# Patient Record
Sex: FEMALE | URBAN - METROPOLITAN AREA
[De-identification: names, ages, dates, MRNs, and addresses within clinical notes are randomized per-mention and may not be internally consistent; named-entity substitution may affect disease eponyms.]

---

## 2021-06-22 ENCOUNTER — TRANSCRIBE ORDER (OUTPATIENT)
Dept: SCHEDULING | Age: 51
End: 2021-06-22

## 2021-06-22 DIAGNOSIS — Z12.31 SCREENING MAMMOGRAM FOR HIGH-RISK PATIENT: Primary | ICD-10-CM

## 2023-08-09 ENCOUNTER — HOSPITAL ENCOUNTER (EMERGENCY)
Facility: HOSPITAL | Age: 53
Discharge: HOME | End: 2023-08-09
Attending: EMERGENCY MEDICINE
Payer: COMMERCIAL

## 2023-08-09 ENCOUNTER — APPOINTMENT (OUTPATIENT)
Facility: HOSPITAL | Age: 53
End: 2023-08-09
Payer: COMMERCIAL

## 2023-08-09 VITALS
HEART RATE: 93 BPM | DIASTOLIC BLOOD PRESSURE: 83 MMHG | BODY MASS INDEX: 42.13 KG/M2 | RESPIRATION RATE: 18 BRPM | TEMPERATURE: 99.7 F | HEIGHT: 68 IN | SYSTOLIC BLOOD PRESSURE: 157 MMHG | OXYGEN SATURATION: 99 % | WEIGHT: 278 LBS

## 2023-08-09 DIAGNOSIS — K04.7 DENTAL INFECTION: Primary | ICD-10-CM

## 2023-08-09 DIAGNOSIS — R22.0 SWELLING OF LEFT SIDE OF FACE: ICD-10-CM

## 2023-08-09 LAB
ALBUMIN SERPL-MCNC: 3.8 G/DL (ref 3.5–5)
ALBUMIN/GLOB SERPL: 0.9 (ref 1.1–2.2)
ALP SERPL-CCNC: 113 U/L (ref 45–117)
ALT SERPL-CCNC: 69 U/L (ref 12–78)
ANION GAP SERPL CALC-SCNC: 7 MMOL/L (ref 5–15)
AST SERPL-CCNC: 26 U/L (ref 15–37)
BASOPHILS # BLD: 0.1 K/UL (ref 0–0.1)
BASOPHILS NFR BLD: 1 % (ref 0–1)
BILIRUB SERPL-MCNC: 0.5 MG/DL (ref 0.2–1)
BUN SERPL-MCNC: 8 MG/DL (ref 6–20)
BUN/CREAT SERPL: 10 (ref 12–20)
CALCIUM SERPL-MCNC: 9.2 MG/DL (ref 8.5–10.1)
CHLORIDE SERPL-SCNC: 103 MMOL/L (ref 97–108)
CO2 SERPL-SCNC: 25 MMOL/L (ref 21–32)
CREAT SERPL-MCNC: 0.79 MG/DL (ref 0.55–1.02)
DIFFERENTIAL METHOD BLD: NORMAL
EOSINOPHIL # BLD: 0.3 K/UL (ref 0–0.4)
EOSINOPHIL NFR BLD: 3 % (ref 0–7)
ERYTHROCYTE [DISTWIDTH] IN BLOOD BY AUTOMATED COUNT: 13 % (ref 11.5–14.5)
GLOBULIN SER CALC-MCNC: 4.1 G/DL (ref 2–4)
GLUCOSE SERPL-MCNC: 148 MG/DL (ref 65–100)
HCT VFR BLD AUTO: 43.1 % (ref 35–47)
HGB BLD-MCNC: 14.5 G/DL (ref 11.5–16)
IMM GRANULOCYTES # BLD AUTO: 0 K/UL (ref 0–0.04)
IMM GRANULOCYTES NFR BLD AUTO: 0 % (ref 0–0.5)
LACTATE BLD-SCNC: 1.79 MMOL/L (ref 0.4–2)
LYMPHOCYTES # BLD: 1.8 K/UL (ref 0.8–3.5)
LYMPHOCYTES NFR BLD: 17 % (ref 12–49)
MCH RBC QN AUTO: 32.2 PG (ref 26–34)
MCHC RBC AUTO-ENTMCNC: 33.6 G/DL (ref 30–36.5)
MCV RBC AUTO: 95.6 FL (ref 80–99)
MONOCYTES # BLD: 0.7 K/UL (ref 0–1)
MONOCYTES NFR BLD: 7 % (ref 5–13)
NEUTS SEG # BLD: 7.3 K/UL (ref 1.8–8)
NEUTS SEG NFR BLD: 72 % (ref 32–75)
NRBC # BLD: 0 K/UL (ref 0–0.01)
NRBC BLD-RTO: 0 PER 100 WBC
PLATELET # BLD AUTO: 335 K/UL (ref 150–400)
PMV BLD AUTO: 9.5 FL (ref 8.9–12.9)
POTASSIUM SERPL-SCNC: 4 MMOL/L (ref 3.5–5.1)
PROT SERPL-MCNC: 7.9 G/DL (ref 6.4–8.2)
RBC # BLD AUTO: 4.51 M/UL (ref 3.8–5.2)
SODIUM SERPL-SCNC: 135 MMOL/L (ref 136–145)
WBC # BLD AUTO: 10.1 K/UL (ref 3.6–11)

## 2023-08-09 PROCEDURE — 6360000002 HC RX W HCPCS: Performed by: EMERGENCY MEDICINE

## 2023-08-09 PROCEDURE — 96375 TX/PRO/DX INJ NEW DRUG ADDON: CPT

## 2023-08-09 PROCEDURE — 70491 CT SOFT TISSUE NECK W/DYE: CPT

## 2023-08-09 PROCEDURE — 83605 ASSAY OF LACTIC ACID: CPT

## 2023-08-09 PROCEDURE — 99285 EMERGENCY DEPT VISIT HI MDM: CPT

## 2023-08-09 PROCEDURE — 6360000004 HC RX CONTRAST MEDICATION: Performed by: EMERGENCY MEDICINE

## 2023-08-09 PROCEDURE — 80053 COMPREHEN METABOLIC PANEL: CPT

## 2023-08-09 PROCEDURE — 96365 THER/PROPH/DIAG IV INF INIT: CPT

## 2023-08-09 PROCEDURE — 36415 COLL VENOUS BLD VENIPUNCTURE: CPT

## 2023-08-09 PROCEDURE — 85025 COMPLETE CBC W/AUTO DIFF WBC: CPT

## 2023-08-09 PROCEDURE — 2580000003 HC RX 258: Performed by: EMERGENCY MEDICINE

## 2023-08-09 RX ORDER — 0.9 % SODIUM CHLORIDE 0.9 %
1000 INTRAVENOUS SOLUTION INTRAVENOUS ONCE
Status: COMPLETED | OUTPATIENT
Start: 2023-08-09 | End: 2023-08-09

## 2023-08-09 RX ORDER — ONDANSETRON 2 MG/ML
4 INJECTION INTRAMUSCULAR; INTRAVENOUS
Status: COMPLETED | OUTPATIENT
Start: 2023-08-09 | End: 2023-08-09

## 2023-08-09 RX ORDER — CLINDAMYCIN PHOSPHATE 300 MG/50ML
300 INJECTION INTRAVENOUS
Status: COMPLETED | OUTPATIENT
Start: 2023-08-09 | End: 2023-08-09

## 2023-08-09 RX ORDER — OXYCODONE HYDROCHLORIDE 5 MG/1
5 TABLET ORAL EVERY 6 HOURS PRN
Qty: 12 TABLET | Refills: 0 | Status: SHIPPED | OUTPATIENT
Start: 2023-08-09 | End: 2023-08-12

## 2023-08-09 RX ORDER — CLINDAMYCIN HYDROCHLORIDE 300 MG/1
300 CAPSULE ORAL 3 TIMES DAILY
Qty: 21 CAPSULE | Refills: 0 | Status: SHIPPED | OUTPATIENT
Start: 2023-08-09 | End: 2023-08-16

## 2023-08-09 RX ORDER — MORPHINE SULFATE 4 MG/ML
4 INJECTION, SOLUTION INTRAMUSCULAR; INTRAVENOUS
Status: DISCONTINUED | OUTPATIENT
Start: 2023-08-09 | End: 2023-08-09 | Stop reason: HOSPADM

## 2023-08-09 RX ADMIN — CLINDAMYCIN PHOSPHATE 300 MG: 300 INJECTION, SOLUTION INTRAVENOUS at 06:52

## 2023-08-09 RX ADMIN — IOPAMIDOL 100 ML: 755 INJECTION, SOLUTION INTRAVENOUS at 06:47

## 2023-08-09 RX ADMIN — ONDANSETRON HYDROCHLORIDE 4 MG: 2 SOLUTION INTRAMUSCULAR; INTRAVENOUS at 06:28

## 2023-08-09 RX ADMIN — SODIUM CHLORIDE 1000 ML: 9 INJECTION, SOLUTION INTRAVENOUS at 06:26

## 2023-08-09 RX ADMIN — MORPHINE SULFATE 4 MG: 4 INJECTION, SOLUTION INTRAMUSCULAR; INTRAVENOUS at 06:29

## 2023-08-09 ASSESSMENT — LIFESTYLE VARIABLES
HOW MANY STANDARD DRINKS CONTAINING ALCOHOL DO YOU HAVE ON A TYPICAL DAY: 1 OR 2
HOW OFTEN DO YOU HAVE A DRINK CONTAINING ALCOHOL: MONTHLY OR LESS

## 2023-08-09 ASSESSMENT — PAIN - FUNCTIONAL ASSESSMENT: PAIN_FUNCTIONAL_ASSESSMENT: 0-10

## 2023-08-09 ASSESSMENT — PAIN SCALES - GENERAL: PAINLEVEL_OUTOF10: 10

## 2023-08-09 NOTE — DISCHARGE INSTRUCTIONS
You were evaluated in the emergency department for left sided facial swelling/pain due to a dental infection. Your examination was reassuring as was your work-up including CT scan which does not show signs of an abscess and blood work. It will be important for you to follow-up with your Dentist as soon as possible. If you develop worsening symptoms such as worsening facial or neck swelling, fevers, or inability to eat, please call [facility].

## 2023-08-09 NOTE — ED PROVIDER NOTES
[facility] EMERGENCY DEPT  EMERGENCY DEPARTMENT ENCOUNTER       Pt Name: Ryan Fonseca  MRN: [id number]  [address] [facility] [place] 1970  Date of evaluation: 8/9/2023  Provider: Hung Vasquez MD   PCP: No primary care provider on file. Note Started: 8:10 AM 8/9/23     CHIEF COMPLAINT       Chief Complaint   Patient presents with    Facial Swelling     Patient ambulatory to triage w c/o facial swelling onset this morning waking up. Patient reports that she is scheduled for a root canal on Mon and she just finished a z pack. HISTORY OF PRESENT ILLNESS: 1 or more elements      History From: Patient, History limited by: No limitations     Ryan Fonseca is a 48 y.o. female who presents with chief complaint of left-sided facial swelling, pain. Symptoms onset over the past 2 days, significantly worsened overnight. Patient endorses pain to left lower jawline where she has had root canal and bridge work performed. She started to develop pain in this area about a week ago, completed a course of azithromycin which helped her symptoms. Over the past 2 days she has recurrence of severe pain and then significant facial swelling overnight. She endorses subjective fevers and chills, diaphoresis, headache and bodyaches at home. She has follow-up with her dentist on Monday. Nursing Notes were all reviewed and agreed with or any disagreements were addressed in the HPI. REVIEW OF SYSTEMS        Positives and Pertinent negatives as per HPI. PAST HISTORY     Past Medical History:  History reviewed. No pertinent past medical history. Past Surgical History:  History reviewed. No pertinent surgical history. Family History:  History reviewed. No pertinent family history. Social History:  Social History     Tobacco Use    Smoking status: Never    Smokeless tobacco: Never   Substance Use Topics    Alcohol use: Not Currently       Allergies:   Allergies   Allergen Reactions    Penicillins Rash       CURRENT

## 2023-08-10 LAB
COMMENT:: NORMAL
SPECIMEN HOLD: NORMAL

## 2024-06-27 ENCOUNTER — TELEPHONE (OUTPATIENT)
Facility: HOSPITAL | Age: 54
End: 2024-06-27

## 2024-06-27 DIAGNOSIS — C50.912 MALIGNANT NEOPLASM OF LEFT BREAST IN FEMALE, ESTROGEN RECEPTOR POSITIVE, UNSPECIFIED SITE OF BREAST (HCC): Primary | ICD-10-CM

## 2024-06-27 DIAGNOSIS — Z17.0 MALIGNANT NEOPLASM OF LEFT BREAST IN FEMALE, ESTROGEN RECEPTOR POSITIVE, UNSPECIFIED SITE OF BREAST (HCC): Primary | ICD-10-CM

## 2024-06-27 NOTE — TELEPHONE ENCOUNTER
[facility]  Breast Navigator Encounter    Name:    Sana Ahmadi  Age:    54 y.o.  Diagnosis:   LEFT breast cancer    Attempted to reach out to patient prior to her appointment on July 10, 2024.      She was not available, so I left a message asking her to call me back at her convenience.       I also mentioned on the message that I was trying to get her set up for a breast MRI prior to her appointment.    I also mentioned that I would be off tomorrow and back in the office on July 1, 2024.         ADDENDUM:   Patient called back within half an hour.      I explained what happens at the first consultation - an exam by the physician, a possible ultrasound, then complete discussion of surgical options and other treatment that may be considered.  I encouraged the patient to bring  someone with her to this appointment.  She will bring her .       Discussed that a breast MRI has been ordered by Dr. Rivera, and is the next step in her work-up.  I explained the MRI procedure to her.  I confirmed that she is not claustrophobic, does not have breast implants and does not have any metal in her body.  I explained that she can eat and drink normally and can drive herself to and from the appointment.  I told her that she would be asked to remove most metal items.   I was able to get her scheduled for July 1, 2024 at [facility] MRI at 1:45 pm.      She has already seen Dr. Valdez, but still has quite a few questions.  I told her to bring these with her to the appt.  I discussed that she is ER+ and HER-2 equivocal still with FISH pending.  I explained that the results of these will help to determine systemic treatment.      I provided the patient with my name and phone number.  Discussed the role of navigation.  I will meet the patient when she comes in for her initial visit.        She was very appreciative of the phone call.                          Natalie Girard, RN, BSN, CBCN  Oncology Breast

## 2024-07-01 ENCOUNTER — HOSPITAL ENCOUNTER (OUTPATIENT)
Facility: HOSPITAL | Age: 54
Discharge: HOME | End: 2024-07-04
Attending: SURGERY
Payer: COMMERCIAL

## 2024-07-01 DIAGNOSIS — C50.912 MALIGNANT NEOPLASM OF LEFT BREAST IN FEMALE, ESTROGEN RECEPTOR POSITIVE, UNSPECIFIED SITE OF BREAST (HCC): ICD-10-CM

## 2024-07-01 DIAGNOSIS — Z17.0 MALIGNANT NEOPLASM OF LEFT BREAST IN FEMALE, ESTROGEN RECEPTOR POSITIVE, UNSPECIFIED SITE OF BREAST (HCC): ICD-10-CM

## 2024-07-01 PROCEDURE — C8908 MRI W/O FOL W/CONT, BREAST,: HCPCS

## 2024-07-01 PROCEDURE — 6360000004 HC RX CONTRAST MEDICATION: Performed by: SURGERY

## 2024-07-01 PROCEDURE — 2580000003 HC RX 258: Performed by: SURGERY

## 2024-07-01 PROCEDURE — A9579 GAD-BASE MR CONTRAST NOS,1ML: HCPCS | Performed by: SURGERY

## 2024-07-01 RX ORDER — 0.9 % SODIUM CHLORIDE 0.9 %
100 INTRAVENOUS SOLUTION INTRAVENOUS ONCE
Status: COMPLETED | OUTPATIENT
Start: 2024-07-01 | End: 2024-07-01

## 2024-07-01 RX ADMIN — SODIUM CHLORIDE 100 ML: 9 INJECTION, SOLUTION INTRAVENOUS at 12:43

## 2024-07-01 RX ADMIN — GADOTERIDOL 20 ML: 279.3 INJECTION, SOLUTION INTRAVENOUS at 12:41

## 2024-07-10 ENCOUNTER — SOCIAL WORK (OUTPATIENT)
Age: 54
End: 2024-07-10

## 2024-07-10 ENCOUNTER — TELEPHONE (OUTPATIENT)
Age: 54
End: 2024-07-10

## 2024-07-10 ENCOUNTER — CLINICAL DOCUMENTATION (OUTPATIENT)
Age: 54
End: 2024-07-10

## 2024-07-10 ENCOUNTER — OFFICE VISIT (OUTPATIENT)
Age: 54
End: 2024-07-10
Payer: COMMERCIAL

## 2024-07-10 VITALS — WEIGHT: 268 LBS | HEIGHT: 68 IN | BODY MASS INDEX: 40.62 KG/M2

## 2024-07-10 DIAGNOSIS — C50.912 INVASIVE LOBULAR CARCINOMA OF LEFT BREAST IN FEMALE (HCC): Primary | ICD-10-CM

## 2024-07-10 PROCEDURE — 76642 ULTRASOUND BREAST LIMITED: CPT | Performed by: SURGERY

## 2024-07-10 PROCEDURE — 99205 OFFICE O/P NEW HI 60 MIN: CPT | Performed by: SURGERY

## 2024-07-10 RX ORDER — CITALOPRAM 40 MG/1
1 TABLET ORAL DAILY
COMMUNITY

## 2024-07-10 RX ORDER — TIRZEPATIDE 7.5 MG/.5ML
INJECTION, SOLUTION SUBCUTANEOUS
COMMUNITY
Start: 2024-06-17

## 2024-07-10 RX ORDER — TIRZEPATIDE 5 MG/.5ML
INJECTION, SOLUTION SUBCUTANEOUS
COMMUNITY

## 2024-07-10 RX ORDER — AMLODIPINE BESYLATE 10 MG/1
1 TABLET ORAL DAILY
COMMUNITY

## 2024-07-10 RX ORDER — ZOLPIDEM TARTRATE 5 MG/1
5 TABLET ORAL NIGHTLY PRN
COMMUNITY
Start: 2024-04-24

## 2024-07-10 RX ORDER — LEMBOREXANT 10 MG/1
1 TABLET, FILM COATED ORAL
COMMUNITY
Start: 2024-05-23

## 2024-07-10 RX ORDER — MIRTAZAPINE 15 MG/1
15 TABLET, FILM COATED ORAL NIGHTLY
COMMUNITY
Start: 2024-06-20

## 2024-07-10 NOTE — PROGRESS NOTES
+HISTORY OF PRESENT ILLNESS  Sana Ahmadi is a 54 y.o. female     HPI NEW patient consult referred by Dr. Gisselle Ge for LEFT breast cancer, IDC.         24: LEFT breast, 2:00, 2 cmfn, bx PATH: ILC, grade 1, measuring 12 mm. ER+(97%)/ME-/HER2-, Ki-67 25%       Family History: Mother-breast cancer-dx age 60  age 64      Mammogram, 24, BIRADS 4c  [facility]      SOZO® Documentation for Visits L-Dex® Analysis for Lymphedema         No data to display                  The patient had a {Measurement timeframe:25714} SOZO measurement which I reviewed today. The score is {Sozo score:47664}, see flowsheet in EMR.     Review of Systems       Physical Exam       ASSESSMENT and PLAN  {Assessment and Plan Chronic Disease:1231369415}  
HISTORY OF PRESENT ILLNESS  Sana Ahmadi is a 54 y.o. female     HPI NEW patient consult referred by  *** for ***.      Family History:      Mammogram, ***, BIRADS ***      Review of Systems      Physical Exam       ASSESSMENT and PLAN  {Assessment and Plan Chronic Disease:4479350410}    
sounds. No stridor.   Chest:       Abdominal:      General: There is no distension.      Palpations: Abdomen is soft. There is no mass.      Tenderness: There is no abdominal tenderness.   Musculoskeletal:         General: Normal range of motion.      Cervical back: Normal range of motion and neck supple.   Lymphadenopathy:      Cervical: No cervical adenopathy.   Skin:     General: Skin is warm.   Neurological:      Mental Status: She is alert and oriented to person, place, and time.   Psychiatric:         Behavior: Behavior normal.         Thought Content: Thought content normal.         Judgment: Judgment normal.       BREAST ULTRASOUND, Pre-op Planning  Indication: Pre-op planning, LEFT breast IDC  Technique: The area was scanned using a high-frequency linear-array near-field transducer  Findings: diffused, hypoechoic streaking in breast and architecturally normal lymph nodes in LEFT axilla  Impression: Breast cancer  Disposition: Follow assessment and plan below.       ASSESSMENT and PLAN   Diagnosis Orders   1. Invasive lobular carcinoma of left breast in female (HCC)          New patient presents for consultation for treatment of LEFT breast ILC, ER+(97%)/IL-/HER2-, Ki-67 25%, clinical stage 2A. Physical examination of the LEFT breast noted a moderate-sized mass in the upper outer quadrant. Ultrasound of the LEFT breast visualized diffused, hypoechoic streaking in breast and architecturally normal lymph nodes in LEFT axilla.     We had a long discussion of options for treatment. The patient was accompanied by her  during this encounter, and over half the time was spent on counseling and coordination of care. We discussed in depth the pathology results, and the need for treatment. The goals of treatment are to treat the breast, and to reduce risk of local or distant recurrence.    We discussed treatment options with risks, complications, benefits, and limitations of lumpectomy with XRT, and mastectomy

## 2024-07-11 ENCOUNTER — CLINICAL DOCUMENTATION (OUTPATIENT)
Age: 54
End: 2024-07-11

## 2024-07-11 ENCOUNTER — PREP FOR PROCEDURE (OUTPATIENT)
Age: 54
End: 2024-07-11

## 2024-07-11 DIAGNOSIS — C50.912 INVASIVE LOBULAR CARCINOMA OF LEFT BREAST, STAGE 1 (HCC): ICD-10-CM

## 2024-07-11 DIAGNOSIS — C50.912 INVASIVE LOBULAR CARCINOMA OF LEFT BREAST IN FEMALE (HCC): Primary | ICD-10-CM

## 2024-07-11 DIAGNOSIS — C50.912 INVASIVE LOBULAR CARCINOMA OF BREAST, STAGE 1, LEFT (HCC): Primary | ICD-10-CM

## 2024-07-12 ENCOUNTER — CLINICAL DOCUMENTATION (OUTPATIENT)
Facility: HOSPITAL | Age: 54
End: 2024-07-12

## 2024-07-12 NOTE — PROGRESS NOTES
[facility]  Breast Navigator Encounter    Name:    Sana Ahmadi  Age:    54 y.o.  Diagnosis:   RIGHT breast cancer  Date of Visit:  7/10/2024    Met patient and her  at the time of her initial consultation with Dr. Rivera.  She and her  felt like all of their questions were answered.  She would like to use Dr. Rivera as her surgeon.   Has decided on BILATERAL mastectomies without reconstruction.      Discussed post-op care after mastectomies, demonstrated MICHELLE drain emptying and showed patient/ an actual MICHELLE drain.  Provided the patient with a post-op camisole, which she tried on.      I let her know that she will hear from Dr. Rivera's surgery scheduler in the next several days with a date/time for her surgery.      She met her  in [place], where she lived for 15 years.  Now lives in [place] in the [facility].  Has good support from her ; they have been  for 13 years.      Provided support.  Discussed that the role of the navigator is to coordinate timely care, make sure the plan of treatment is understood, and all questions are answered.   Provided patient with my business card.       I will continue to follow the patient.                              Natalie Girard, RN, BSN, [facility]  Oncology Breast Navigator     [address]  [address]  W: [phone]  F: [phone]  [email or web address]  Good Help to Those in Need®

## 2024-07-12 NOTE — PROGRESS NOTES
[facility]  Breast Navigator Encounter    Name:    Sana Ahmadi  Age:    54 y.o.  Diagnosis:   LEFT breast cancer    E-mailed patient MICHELLE drain handout as well as the MICHELLE drain output sheet.      Received response back from her that she received this.  Had some other questions.  See below:    I am so impressed with all of you and most recently danika - I couldn't believe that she was taking care of my scheduling at 8:30 this morning.     I have 3 questions actually- will I need to stay overnight at the hospital?  My friend sent me a lot of vitamin supplements- are there any concerns about taking this sort of thing?  And , lastly,  I talked coy into adopting a kitten if I can get a medical note saying it will enhance my outcome- any chance of this- the note not any outcome promises ha ha …    My response is below:      You will stay in the hospital for one night.  Dr. Rivera injects lots of local numbing agent at the time of the mastectomies, so pain is generally well controlled, and patients are ready to go home the next day.    You may take whatever vitamins/supplements that you want to right now, however you will need to stop all of these about one week pre-op (about 7/30) because many vitamins will thin the blood, and this can increase the bleeding risk.      I will ask Dr. Rivera if he will write a note about the kitten ?? I think pets always enhance our quality of life.   I'm sure that he won't mind doing this.                             Natalie Girard RN, BSN, [facility]  Oncology Breast Navigator     [address]  [address]  W: [phone]  F: [phone]  [email or web address]  Good Help to Those in Need®

## 2024-07-15 NOTE — PROGRESS NOTES
NCCN Distress Thermometer    Date Screening Completed: 7/10/24    Screening Declined:  [] Yes    Number that best describes how much distress you've experienced in the past week, including today?  0 [] - No distress 1 []      2 []      3 []      4 []       5 []       6 []      7 []      8 []      9 [x]       10 [] - Extreme distress    PROBLEM LIST  Have you had concerns about any of the items below in the past week, including today?      Physical Concerns Practical Concerns   [x] Pain [] Taking care of myself    [] Sleep [] Taking care of others    [x] Fatigue [x] Work   [] Tobacco use  [] School   [] Substance use  [] Housing   [] Memory or concentration [x] Finances   [] Sexual health [] Insurance   [] Changes in eating  [] Transportation   [] Loss or change of physical abilities  []     [] Having enough food   Emotional Concerns [] Access to medicine   [x] Worry or anxiety [] Treatment decisions   [] Sadness or depression    [] Loss of interest or enjoyment  Spiritual or Tenriism Concerns   [] Grief or loss  [] Sense of meaning or purpose   [] Fear [] Changes in arti or beliefs   [] Loneliness  [] Death, dying, or afterlife   [] Anger [] Conflict between beliefs and cancer treatments    [] Changes in appearance [] Relationship with the sacred   [] Feelings of worthlessness or being a burden [] Ritual or dietary needs        Social Concerns     [] Relationship with spouse or partner     [] Relationship with children    [] Relationship with family members     [] Relationship with friends or coworkers     [] Communication with health care team     [] Ability to have children     [] Prejudice or discrimination        Other Concerns:     Patient received resource information and education:  [x] Yes  [] No

## 2024-07-15 NOTE — PROGRESS NOTES
[facility]   Psychosocial Assessment    [] Med-Onc [facility] [] Med-Onc [facility] [] Med-Onc [facility] [] Rad-Onc [facility] [] Rad-Onc [facility] [] Rad-Onc [facility] [] Rad-Onc [facility] [x] Breast Center       Patient: Sana Ahmadi    Reason for Assessment:    [] Social Work Referral  [x] Initial Assessment  [x] New Diagnosis  [] Other:     Advance Care Planning:  [] AMD Discussed and Completed  [] AMD Reviewed Verbally [] AMD Copy Provided  [x] Conversation Deferred [] Conversation Declined      Sources of Information:    [x] Patient  [x] Family  [x] Staff  [x] Medical Record    Mental Status:    [x] Alert  [] Lethargic  [] Unresponsive  [] Unable to assess   Oriented to: [x] Person  [x] Place  [x] Time  [x] Situation      Relationship Status:  [] Single  [x]   [] Significant Other/Life Partner  []   []   []     Living Circumstances:  [] Lives Alone  [x] Family/Significant Other in Household  [] Roommates  [] Children in the Home  [] Paid Caregivers  [] Assisted Living Facility/Group Home  [] Skilled Nursing Facility  [] Homeless  [] Incarcerated  [] Environmental/Care Concerns  [] Other:    Employment Status:   [x] Employed Full-time  [] Employed Part-time  [] Homemaker  [] Retired  [] Short-Term Disability  [] Long-Term Disability  [] Unemployed  [] SSI  [] SSDI  [] Self-Employment    Transportation Resources:   [x] Self  [x] Family/Friends  [] Insurance  [] Community Programs  [] Other:    Barriers to Learning:    [] Language  [] Developmental  [] Cognitive  [] Altered Mental Status  [] Visual/Hearing Impairment  [] Unable to Read/Write  [] Motivational  [] Challenges Understanding Medical Jargon [x] No Barriers Identified      Financial/Legal Concerns:    [] Uninsured  [] Limited Income/Resources  [] Non-Citizen  [] Food Insecurity [] No Concerns Identified   [x] Other: Pt indicates finances as a concern on her distress screening; did not discuss at this

## 2024-07-17 DIAGNOSIS — C50.912 MALIGNANT NEOPLASM OF LEFT BREAST IN FEMALE, ESTROGEN RECEPTOR POSITIVE, UNSPECIFIED SITE OF BREAST (HCC): Primary | ICD-10-CM

## 2024-07-17 DIAGNOSIS — Z17.0 MALIGNANT NEOPLASM OF LEFT BREAST IN FEMALE, ESTROGEN RECEPTOR POSITIVE, UNSPECIFIED SITE OF BREAST (HCC): Primary | ICD-10-CM

## 2024-07-23 ENCOUNTER — CLINICAL DOCUMENTATION (OUTPATIENT)
Age: 54
End: 2024-07-23

## 2024-07-23 ENCOUNTER — HOSPITAL ENCOUNTER (OUTPATIENT)
Facility: HOSPITAL | Age: 54
Discharge: HOME | End: 2024-07-26
Payer: COMMERCIAL

## 2024-07-23 VITALS
TEMPERATURE: 97.1 F | HEART RATE: 86 BPM | RESPIRATION RATE: 16 BRPM | DIASTOLIC BLOOD PRESSURE: 77 MMHG | BODY MASS INDEX: 40.73 KG/M2 | HEIGHT: 68 IN | SYSTOLIC BLOOD PRESSURE: 159 MMHG | WEIGHT: 268.74 LBS | OXYGEN SATURATION: 97 %

## 2024-07-23 LAB
ABO + RH BLD: NORMAL
ANION GAP SERPL CALC-SCNC: 8 MMOL/L (ref 5–15)
BASOPHILS # BLD: 0.1 K/UL (ref 0–0.1)
BASOPHILS NFR BLD: 1 % (ref 0–1)
BLOOD GROUP ANTIBODIES SERPL: NORMAL
BUN SERPL-MCNC: 11 MG/DL (ref 6–20)
BUN/CREAT SERPL: 17 (ref 12–20)
CALCIUM SERPL-MCNC: 8.9 MG/DL (ref 8.5–10.1)
CHLORIDE SERPL-SCNC: 106 MMOL/L (ref 97–108)
CO2 SERPL-SCNC: 24 MMOL/L (ref 21–32)
CREAT SERPL-MCNC: 0.63 MG/DL (ref 0.55–1.02)
DIFFERENTIAL METHOD BLD: NORMAL
EOSINOPHIL # BLD: 0.3 K/UL (ref 0–0.4)
EOSINOPHIL NFR BLD: 5 % (ref 0–7)
ERYTHROCYTE [DISTWIDTH] IN BLOOD BY AUTOMATED COUNT: 13.1 % (ref 11.5–14.5)
GLUCOSE SERPL-MCNC: 97 MG/DL (ref 65–100)
HCT VFR BLD AUTO: 41.4 % (ref 35–47)
HGB BLD-MCNC: 14.1 G/DL (ref 11.5–16)
IMM GRANULOCYTES # BLD AUTO: 0 K/UL (ref 0–0.04)
IMM GRANULOCYTES NFR BLD AUTO: 0 % (ref 0–0.5)
LYMPHOCYTES # BLD: 1.8 K/UL (ref 0.8–3.5)
LYMPHOCYTES NFR BLD: 28 % (ref 12–49)
MCH RBC QN AUTO: 32.3 PG (ref 26–34)
MCHC RBC AUTO-ENTMCNC: 34.1 G/DL (ref 30–36.5)
MCV RBC AUTO: 94.7 FL (ref 80–99)
MONOCYTES # BLD: 0.5 K/UL (ref 0–1)
MONOCYTES NFR BLD: 7 % (ref 5–13)
NEUTS SEG # BLD: 3.7 K/UL (ref 1.8–8)
NEUTS SEG NFR BLD: 59 % (ref 32–75)
NRBC # BLD: 0 K/UL (ref 0–0.01)
NRBC BLD-RTO: 0 PER 100 WBC
PLATELET # BLD AUTO: 302 K/UL (ref 150–400)
PMV BLD AUTO: 9.7 FL (ref 8.9–12.9)
POTASSIUM SERPL-SCNC: 4.1 MMOL/L (ref 3.5–5.1)
RBC # BLD AUTO: 4.37 M/UL (ref 3.8–5.2)
SODIUM SERPL-SCNC: 138 MMOL/L (ref 136–145)
SPECIMEN EXP DATE BLD: NORMAL
WBC # BLD AUTO: 6.4 K/UL (ref 3.6–11)

## 2024-07-23 PROCEDURE — 86850 RBC ANTIBODY SCREEN: CPT

## 2024-07-23 PROCEDURE — 93005 ELECTROCARDIOGRAM TRACING: CPT | Performed by: SURGERY

## 2024-07-23 PROCEDURE — 36415 COLL VENOUS BLD VENIPUNCTURE: CPT

## 2024-07-23 PROCEDURE — 80048 BASIC METABOLIC PNL TOTAL CA: CPT

## 2024-07-23 PROCEDURE — 86900 BLOOD TYPING SEROLOGIC ABO: CPT

## 2024-07-23 PROCEDURE — 85025 COMPLETE CBC W/AUTO DIFF WBC: CPT

## 2024-07-23 PROCEDURE — 86901 BLOOD TYPING SEROLOGIC RH(D): CPT

## 2024-07-23 RX ORDER — CAFFEINE 200 MG
200 TABLET ORAL EVERY 4 HOURS PRN
COMMUNITY

## 2024-07-23 RX ORDER — ACETAMINOPHEN 325 MG/1
650 TABLET ORAL EVERY 6 HOURS PRN
COMMUNITY

## 2024-07-23 NOTE — DISCHARGE INSTRUCTIONS
[facility]                   [address] [place] [address]   MAIN OR                                  [phone]   MAIN PRE OP                          [phone]                                                                                AMBULATORY PRE OP          [phone]  PRE-ADMISSION TESTING    [phone]   Surgery Date:  Tuesday 8/6/24       Is surgery arrival time given by surgeon?  YES  NO  If “NO”, [facility] staff will call you between 3 and 7pm the day before your surgery with your arrival time. (If your surgery is on a Monday, we will call you the Friday before.)    Call [phone] after 7pm Monday-Friday if you did not receive this call.    INSTRUCTIONS BEFORE YOUR SURGERY   When You  Arrive Arrive at the 2nd Floor Admitting Desk on the day of your surgery  Have your insurance card, photo ID, and any copayment (if needed)   Food   and   Drink NO food or drink after midnight the night before surgery    This means NO water, gum, mints, coffee, juice, etc.  No alcohol (beer, wine, liquor) 24 hours before and after surgery   Medications to   TAKE   Morning of Surgery MEDICATIONS TO TAKE THE MORNING OF SURGERY WITH A SIP OF WATER:   Metoprolol  Amlodipine  citalopram   Medications  To  STOP      7 days before surgery Non-Steroidal anti-inflammatory Drugs (NSAID's): for example, Ibuprofen (Advil, Motrin), Naproxen (Aleve)  Aspirin, if taking for pain   Herbal supplements, vitamins, and fish oil  Other:  (Pain medications not listed above, including Tylenol may be taken)   Blood  Thinners If you take  Aspirin, Plavix, Coumadin, or any blood-thinning or anti-blood clot medicine, talk to the doctor who prescribed the medications for pre-operative instructions.   Bathing Clothing  Jewelry  Valuables     If you shower the morning of surgery, please do not apply anything to your skin (lotions, powders, deodorant, or makeup, especially mascara)  Follow

## 2024-07-23 NOTE — PROGRESS NOTES
Pt's fmla ppw has been received , and completed . Ppw will be faxed to fax number provided and completed ppw will be scanned in chart

## 2024-07-24 LAB
EKG ATRIAL RATE: 76 BPM
EKG DIAGNOSIS: NORMAL
EKG P AXIS: 46 DEGREES
EKG P-R INTERVAL: 164 MS
EKG Q-T INTERVAL: 406 MS
EKG QRS DURATION: 84 MS
EKG QTC CALCULATION (BAZETT): 456 MS
EKG R AXIS: 59 DEGREES
EKG T AXIS: 72 DEGREES
EKG VENTRICULAR RATE: 76 BPM

## 2024-08-05 NOTE — PERIOP NOTE
Hello,     You are scheduled to have surgery tomorrow at [facility].     We would like for you to arrive at  1000 am  We are located on the second floor, [address]. You will check-in at the registration desk located outside the elevators on the second floor prior to proceeding to [address].  Remember nothing to eat or drink after midnight. If you need to take medications the morning of surgery, please take with a few sips of water.   Wear loose, comfortable clothing and leave all your jewelry at home.   You may bring your cell phone with you.  One family member will be allowed in the pre-op area once you are dressed and your IV has been started.   You will need someone to drive you home and be with you for 24 hours post-anesthesia.     We look forward to seeing you! Call [phone] for questions after hours and [phone] between 5:30AM and 6PM.     Thanks!    [facility] ASU PREOP TEAM

## 2024-08-06 ENCOUNTER — ANESTHESIA EVENT (OUTPATIENT)
Facility: HOSPITAL | Age: 54
End: 2024-08-06
Payer: COMMERCIAL

## 2024-08-06 ENCOUNTER — ANESTHESIA (OUTPATIENT)
Facility: HOSPITAL | Age: 54
End: 2024-08-06
Payer: COMMERCIAL

## 2024-08-06 ENCOUNTER — HOSPITAL ENCOUNTER (OUTPATIENT)
Facility: HOSPITAL | Age: 54
LOS: 1 days | Discharge: HOME | End: 2024-08-07
Attending: SURGERY | Admitting: SURGERY
Payer: COMMERCIAL

## 2024-08-06 ENCOUNTER — APPOINTMENT (OUTPATIENT)
Facility: HOSPITAL | Age: 54
End: 2024-08-06
Attending: SURGERY
Payer: COMMERCIAL

## 2024-08-06 DIAGNOSIS — C50.912 INVASIVE LOBULAR CARCINOMA OF LEFT BREAST IN FEMALE (HCC): ICD-10-CM

## 2024-08-06 DIAGNOSIS — Z17.0 MALIGNANT NEOPLASM OF LEFT BREAST IN FEMALE, ESTROGEN RECEPTOR POSITIVE, UNSPECIFIED SITE OF BREAST (HCC): ICD-10-CM

## 2024-08-06 DIAGNOSIS — C50.912 MALIGNANT NEOPLASM OF LEFT BREAST IN FEMALE, ESTROGEN RECEPTOR POSITIVE, UNSPECIFIED SITE OF BREAST (HCC): ICD-10-CM

## 2024-08-06 PROBLEM — C50.919 BREAST CANCER IN FEMALE (HCC): Status: ACTIVE | Noted: 2024-08-06

## 2024-08-06 PROCEDURE — 2709999900 HC NON-CHARGEABLE SUPPLY: Performed by: SURGERY

## 2024-08-06 PROCEDURE — 2500000003 HC RX 250 WO HCPCS: Performed by: SURGERY

## 2024-08-06 PROCEDURE — 6370000000 HC RX 637 (ALT 250 FOR IP): Performed by: SURGERY

## 2024-08-06 PROCEDURE — 3430000000 HC RX DIAGNOSTIC RADIOPHARMACEUTICAL: Performed by: SURGERY

## 2024-08-06 PROCEDURE — 6360000002 HC RX W HCPCS: Performed by: SURGERY

## 2024-08-06 PROCEDURE — 2700000000 HC OXYGEN THERAPY PER DAY

## 2024-08-06 PROCEDURE — 2580000003 HC RX 258: Performed by: SURGERY

## 2024-08-06 PROCEDURE — 2580000003 HC RX 258

## 2024-08-06 PROCEDURE — 88342 IMHCHEM/IMCYTCHM 1ST ANTB: CPT

## 2024-08-06 PROCEDURE — 94761 N-INVAS EAR/PLS OXIMETRY MLT: CPT

## 2024-08-06 PROCEDURE — C9290 INJ, BUPIVACAINE LIPOSOME: HCPCS | Performed by: SURGERY

## 2024-08-06 PROCEDURE — 6360000002 HC RX W HCPCS

## 2024-08-06 PROCEDURE — 3700000000 HC ANESTHESIA ATTENDED CARE: Performed by: SURGERY

## 2024-08-06 PROCEDURE — 3600000003 HC SURGERY LEVEL 3 BASE: Performed by: SURGERY

## 2024-08-06 PROCEDURE — 3700000001 HC ADD 15 MINUTES (ANESTHESIA): Performed by: SURGERY

## 2024-08-06 PROCEDURE — 7100000001 HC PACU RECOVERY - ADDTL 15 MIN: Performed by: SURGERY

## 2024-08-06 PROCEDURE — 3600000013 HC SURGERY LEVEL 3 ADDTL 15MIN: Performed by: SURGERY

## 2024-08-06 PROCEDURE — 2500000003 HC RX 250 WO HCPCS

## 2024-08-06 PROCEDURE — 38525 BIOPSY/REMOVAL LYMPH NODES: CPT | Performed by: SURGERY

## 2024-08-06 PROCEDURE — 88360 TUMOR IMMUNOHISTOCHEM/MANUAL: CPT

## 2024-08-06 PROCEDURE — 88341 IMHCHEM/IMCYTCHM EA ADD ANTB: CPT

## 2024-08-06 PROCEDURE — 19303 MAST SIMPLE COMPLETE: CPT | Performed by: SURGERY

## 2024-08-06 PROCEDURE — 88307 TISSUE EXAM BY PATHOLOGIST: CPT

## 2024-08-06 PROCEDURE — 78195 LYMPH SYSTEM IMAGING: CPT

## 2024-08-06 PROCEDURE — A9520 TC99 TILMANOCEPT DIAG 0.5MCI: HCPCS | Performed by: SURGERY

## 2024-08-06 PROCEDURE — 7100000000 HC PACU RECOVERY - FIRST 15 MIN: Performed by: SURGERY

## 2024-08-06 RX ORDER — CITALOPRAM 20 MG/1
40 TABLET ORAL DAILY
Status: DISCONTINUED | OUTPATIENT
Start: 2024-08-07 | End: 2024-08-07 | Stop reason: HOSPADM

## 2024-08-06 RX ORDER — BUPIVACAINE HYDROCHLORIDE AND EPINEPHRINE 5; .0091 MG/ML; MG/ML
30 INJECTION, SOLUTION DENTAL; INFILTRATION ONCE
Status: DISCONTINUED | OUTPATIENT
Start: 2024-08-06 | End: 2024-08-06

## 2024-08-06 RX ORDER — MIDAZOLAM HYDROCHLORIDE 2 MG/2ML
2 INJECTION, SOLUTION INTRAMUSCULAR; INTRAVENOUS
Status: DISCONTINUED | OUTPATIENT
Start: 2024-08-06 | End: 2024-08-06 | Stop reason: HOSPADM

## 2024-08-06 RX ORDER — ONDANSETRON 2 MG/ML
4 INJECTION INTRAMUSCULAR; INTRAVENOUS EVERY 6 HOURS PRN
Status: DISCONTINUED | OUTPATIENT
Start: 2024-08-06 | End: 2024-08-07 | Stop reason: HOSPADM

## 2024-08-06 RX ORDER — METOPROLOL SUCCINATE 50 MG/1
100 TABLET, EXTENDED RELEASE ORAL DAILY
Status: DISCONTINUED | OUTPATIENT
Start: 2024-08-07 | End: 2024-08-07 | Stop reason: HOSPADM

## 2024-08-06 RX ORDER — SODIUM CHLORIDE, SODIUM LACTATE, POTASSIUM CHLORIDE, CALCIUM CHLORIDE 600; 310; 30; 20 MG/100ML; MG/100ML; MG/100ML; MG/100ML
INJECTION, SOLUTION INTRAVENOUS CONTINUOUS
Status: DISCONTINUED | OUTPATIENT
Start: 2024-08-06 | End: 2024-08-06 | Stop reason: HOSPADM

## 2024-08-06 RX ORDER — EPHEDRINE SULFATE/0.9% NACL/PF 25 MG/5 ML
SYRINGE (ML) INTRAVENOUS PRN
Status: DISCONTINUED | OUTPATIENT
Start: 2024-08-06 | End: 2024-08-06 | Stop reason: SDUPTHER

## 2024-08-06 RX ORDER — OXYCODONE HYDROCHLORIDE 5 MG/1
5 TABLET ORAL EVERY 4 HOURS PRN
Status: DISCONTINUED | OUTPATIENT
Start: 2024-08-06 | End: 2024-08-07 | Stop reason: HOSPADM

## 2024-08-06 RX ORDER — OXYCODONE HYDROCHLORIDE 10 MG/1
10 TABLET ORAL EVERY 4 HOURS PRN
Status: DISCONTINUED | OUTPATIENT
Start: 2024-08-06 | End: 2024-08-07 | Stop reason: HOSPADM

## 2024-08-06 RX ORDER — DEXAMETHASONE SODIUM PHOSPHATE 4 MG/ML
INJECTION, SOLUTION INTRA-ARTICULAR; INTRALESIONAL; INTRAMUSCULAR; INTRAVENOUS; SOFT TISSUE PRN
Status: DISCONTINUED | OUTPATIENT
Start: 2024-08-06 | End: 2024-08-06 | Stop reason: SDUPTHER

## 2024-08-06 RX ORDER — FENTANYL CITRATE 50 UG/ML
INJECTION, SOLUTION INTRAMUSCULAR; INTRAVENOUS PRN
Status: DISCONTINUED | OUTPATIENT
Start: 2024-08-06 | End: 2024-08-06 | Stop reason: SDUPTHER

## 2024-08-06 RX ORDER — FAMOTIDINE 10 MG/ML
INJECTION, SOLUTION INTRAVENOUS PRN
Status: DISCONTINUED | OUTPATIENT
Start: 2024-08-06 | End: 2024-08-06 | Stop reason: SDUPTHER

## 2024-08-06 RX ORDER — ONDANSETRON 4 MG/1
4 TABLET, ORALLY DISINTEGRATING ORAL EVERY 8 HOURS PRN
Status: DISCONTINUED | OUTPATIENT
Start: 2024-08-06 | End: 2024-08-07 | Stop reason: HOSPADM

## 2024-08-06 RX ORDER — ONDANSETRON 2 MG/ML
4 INJECTION INTRAMUSCULAR; INTRAVENOUS
Status: DISCONTINUED | OUTPATIENT
Start: 2024-08-06 | End: 2024-08-06 | Stop reason: HOSPADM

## 2024-08-06 RX ORDER — FENTANYL CITRATE 50 UG/ML
100 INJECTION, SOLUTION INTRAMUSCULAR; INTRAVENOUS
Status: DISCONTINUED | OUTPATIENT
Start: 2024-08-06 | End: 2024-08-06 | Stop reason: HOSPADM

## 2024-08-06 RX ORDER — ACETAMINOPHEN 325 MG/1
650 TABLET ORAL EVERY 4 HOURS PRN
Status: DISCONTINUED | OUTPATIENT
Start: 2024-08-06 | End: 2024-08-07 | Stop reason: HOSPADM

## 2024-08-06 RX ORDER — LIDOCAINE HYDROCHLORIDE 20 MG/ML
INJECTION, SOLUTION EPIDURAL; INFILTRATION; INTRACAUDAL; PERINEURAL PRN
Status: DISCONTINUED | OUTPATIENT
Start: 2024-08-06 | End: 2024-08-06 | Stop reason: SDUPTHER

## 2024-08-06 RX ORDER — ROCURONIUM BROMIDE 10 MG/ML
INJECTION, SOLUTION INTRAVENOUS PRN
Status: DISCONTINUED | OUTPATIENT
Start: 2024-08-06 | End: 2024-08-06 | Stop reason: SDUPTHER

## 2024-08-06 RX ORDER — LIDOCAINE HYDROCHLORIDE 10 MG/ML
1 INJECTION, SOLUTION EPIDURAL; INFILTRATION; INTRACAUDAL; PERINEURAL
Status: DISCONTINUED | OUTPATIENT
Start: 2024-08-06 | End: 2024-08-06 | Stop reason: HOSPADM

## 2024-08-06 RX ORDER — SODIUM CHLORIDE 0.9 % (FLUSH) 0.9 %
5-40 SYRINGE (ML) INJECTION PRN
Status: DISCONTINUED | OUTPATIENT
Start: 2024-08-06 | End: 2024-08-07 | Stop reason: HOSPADM

## 2024-08-06 RX ORDER — SODIUM CHLORIDE, SODIUM LACTATE, POTASSIUM CHLORIDE, CALCIUM CHLORIDE 600; 310; 30; 20 MG/100ML; MG/100ML; MG/100ML; MG/100ML
INJECTION, SOLUTION INTRAVENOUS CONTINUOUS PRN
Status: DISCONTINUED | OUTPATIENT
Start: 2024-08-06 | End: 2024-08-06 | Stop reason: SDUPTHER

## 2024-08-06 RX ORDER — SODIUM CHLORIDE 0.9 % (FLUSH) 0.9 %
5-40 SYRINGE (ML) INJECTION EVERY 12 HOURS SCHEDULED
Status: DISCONTINUED | OUTPATIENT
Start: 2024-08-06 | End: 2024-08-07 | Stop reason: HOSPADM

## 2024-08-06 RX ORDER — PROPOFOL 10 MG/ML
INJECTION, EMULSION INTRAVENOUS PRN
Status: DISCONTINUED | OUTPATIENT
Start: 2024-08-06 | End: 2024-08-06 | Stop reason: SDUPTHER

## 2024-08-06 RX ORDER — MIDAZOLAM HYDROCHLORIDE 1 MG/ML
INJECTION INTRAMUSCULAR; INTRAVENOUS PRN
Status: DISCONTINUED | OUTPATIENT
Start: 2024-08-06 | End: 2024-08-06 | Stop reason: SDUPTHER

## 2024-08-06 RX ORDER — AMLODIPINE BESYLATE 5 MG/1
10 TABLET ORAL DAILY
Status: DISCONTINUED | OUTPATIENT
Start: 2024-08-07 | End: 2024-08-07 | Stop reason: HOSPADM

## 2024-08-06 RX ORDER — DIPHENHYDRAMINE HYDROCHLORIDE 50 MG/ML
12.5 INJECTION INTRAMUSCULAR; INTRAVENOUS
Status: DISCONTINUED | OUTPATIENT
Start: 2024-08-06 | End: 2024-08-06 | Stop reason: HOSPADM

## 2024-08-06 RX ORDER — DEXTROSE MONOHYDRATE, SODIUM CHLORIDE, AND POTASSIUM CHLORIDE 50; 1.49; 4.5 G/1000ML; G/1000ML; G/1000ML
INJECTION, SOLUTION INTRAVENOUS CONTINUOUS
Status: DISCONTINUED | OUTPATIENT
Start: 2024-08-06 | End: 2024-08-07 | Stop reason: HOSPADM

## 2024-08-06 RX ORDER — OXYCODONE HYDROCHLORIDE AND ACETAMINOPHEN 5; 325 MG/1; MG/1
1 TABLET ORAL EVERY 4 HOURS PRN
Qty: 15 TABLET | Refills: 0 | Status: SHIPPED | OUTPATIENT
Start: 2024-08-06 | End: 2024-08-09

## 2024-08-06 RX ORDER — MIRTAZAPINE 15 MG/1
15 TABLET, FILM COATED ORAL NIGHTLY
Status: DISCONTINUED | OUTPATIENT
Start: 2024-08-06 | End: 2024-08-07 | Stop reason: HOSPADM

## 2024-08-06 RX ORDER — SODIUM CHLORIDE 9 MG/ML
INJECTION, SOLUTION INTRAVENOUS PRN
Status: DISCONTINUED | OUTPATIENT
Start: 2024-08-06 | End: 2024-08-07 | Stop reason: HOSPADM

## 2024-08-06 RX ORDER — ONDANSETRON 2 MG/ML
INJECTION INTRAMUSCULAR; INTRAVENOUS PRN
Status: DISCONTINUED | OUTPATIENT
Start: 2024-08-06 | End: 2024-08-06 | Stop reason: SDUPTHER

## 2024-08-06 RX ORDER — NALOXONE HYDROCHLORIDE 0.4 MG/ML
INJECTION, SOLUTION INTRAMUSCULAR; INTRAVENOUS; SUBCUTANEOUS PRN
Status: DISCONTINUED | OUTPATIENT
Start: 2024-08-06 | End: 2024-08-06 | Stop reason: HOSPADM

## 2024-08-06 RX ORDER — AMLODIPINE BESYLATE 5 MG/1
10 TABLET ORAL DAILY
Status: DISCONTINUED | OUTPATIENT
Start: 2024-08-06 | End: 2024-08-06

## 2024-08-06 RX ADMIN — ROCURONIUM BROMIDE 30 MG: 10 INJECTION, SOLUTION INTRAVENOUS at 12:09

## 2024-08-06 RX ADMIN — EPHEDRINE SULFATE 10 MG: 5 INJECTION INTRAVENOUS at 12:25

## 2024-08-06 RX ADMIN — SODIUM CHLORIDE, POTASSIUM CHLORIDE, SODIUM LACTATE AND CALCIUM CHLORIDE: 600; 310; 30; 20 INJECTION, SOLUTION INTRAVENOUS at 11:39

## 2024-08-06 RX ADMIN — POTASSIUM CHLORIDE, DEXTROSE MONOHYDRATE AND SODIUM CHLORIDE: 150; 5; 450 INJECTION, SOLUTION INTRAVENOUS at 16:13

## 2024-08-06 RX ADMIN — PROPOFOL 100 MCG/KG/MIN: 10 INJECTION, EMULSION INTRAVENOUS at 11:50

## 2024-08-06 RX ADMIN — ROCURONIUM BROMIDE 50 MG: 10 INJECTION, SOLUTION INTRAVENOUS at 11:48

## 2024-08-06 RX ADMIN — DEXAMETHASONE SODIUM PHOSPHATE 8 MG: 4 INJECTION INTRA-ARTICULAR; INTRALESIONAL; INTRAMUSCULAR; INTRAVENOUS; SOFT TISSUE at 12:02

## 2024-08-06 RX ADMIN — EPHEDRINE SULFATE 10 MG: 5 INJECTION INTRAVENOUS at 12:30

## 2024-08-06 RX ADMIN — OXYCODONE HYDROCHLORIDE 10 MG: 10 TABLET ORAL at 20:19

## 2024-08-06 RX ADMIN — LIDOCAINE HYDROCHLORIDE 100 MG: 20 INJECTION, SOLUTION EPIDURAL; INFILTRATION; INTRACAUDAL; PERINEURAL at 11:48

## 2024-08-06 RX ADMIN — ROCURONIUM BROMIDE 10 MG: 10 INJECTION, SOLUTION INTRAVENOUS at 13:11

## 2024-08-06 RX ADMIN — PROPOFOL 200 MG: 10 INJECTION, EMULSION INTRAVENOUS at 11:48

## 2024-08-06 RX ADMIN — MIRTAZAPINE 15 MG: 15 TABLET, FILM COATED ORAL at 20:21

## 2024-08-06 RX ADMIN — FENTANYL CITRATE 50 MCG: 50 INJECTION, SOLUTION INTRAMUSCULAR; INTRAVENOUS at 12:12

## 2024-08-06 RX ADMIN — FENTANYL CITRATE 50 MCG: 50 INJECTION, SOLUTION INTRAMUSCULAR; INTRAVENOUS at 11:47

## 2024-08-06 RX ADMIN — SODIUM CHLORIDE, PRESERVATIVE FREE 10 ML: 5 INJECTION INTRAVENOUS at 20:20

## 2024-08-06 RX ADMIN — MIDAZOLAM HYDROCHLORIDE 3 MG: 1 INJECTION, SOLUTION INTRAMUSCULAR; INTRAVENOUS at 11:39

## 2024-08-06 RX ADMIN — FAMOTIDINE 20 MG: 10 INJECTION INTRAVENOUS at 12:03

## 2024-08-06 RX ADMIN — TILMANOCEPT 0.5 MILLICURIE: KIT at 10:18

## 2024-08-06 RX ADMIN — SUGAMMADEX 200 MG: 100 INJECTION, SOLUTION INTRAVENOUS at 13:47

## 2024-08-06 RX ADMIN — FENTANYL CITRATE 50 MCG: 50 INJECTION, SOLUTION INTRAMUSCULAR; INTRAVENOUS at 12:54

## 2024-08-06 RX ADMIN — FENTANYL CITRATE 50 MCG: 50 INJECTION, SOLUTION INTRAMUSCULAR; INTRAVENOUS at 12:49

## 2024-08-06 RX ADMIN — ONDANSETRON 4 MG: 2 INJECTION INTRAMUSCULAR; INTRAVENOUS at 12:03

## 2024-08-06 RX ADMIN — SODIUM CHLORIDE 3000 MG: 900 INJECTION INTRAVENOUS at 11:59

## 2024-08-06 RX ADMIN — FENTANYL CITRATE 50 MCG: 50 INJECTION, SOLUTION INTRAMUSCULAR; INTRAVENOUS at 12:09

## 2024-08-06 RX ADMIN — OXYCODONE HYDROCHLORIDE 10 MG: 10 TABLET ORAL at 16:15

## 2024-08-06 RX ADMIN — MIDAZOLAM HYDROCHLORIDE 2 MG: 1 INJECTION, SOLUTION INTRAMUSCULAR; INTRAVENOUS at 11:47

## 2024-08-06 ASSESSMENT — PAIN SCALES - GENERAL
PAINLEVEL_OUTOF10: 7
PAINLEVEL_OUTOF10: 0
PAINLEVEL_OUTOF10: 0
PAINLEVEL_OUTOF10: 6
PAINLEVEL_OUTOF10: 4
PAINLEVEL_OUTOF10: 7

## 2024-08-06 ASSESSMENT — PAIN - FUNCTIONAL ASSESSMENT: PAIN_FUNCTIONAL_ASSESSMENT: 0-10

## 2024-08-06 ASSESSMENT — LIFESTYLE VARIABLES: SMOKING_STATUS: 1

## 2024-08-06 ASSESSMENT — PAIN DESCRIPTION - ORIENTATION: ORIENTATION: LEFT

## 2024-08-06 ASSESSMENT — PAIN DESCRIPTION - LOCATION: LOCATION: BREAST

## 2024-08-06 ASSESSMENT — PAIN DESCRIPTION - DESCRIPTORS: DESCRIPTORS: ACHING

## 2024-08-06 NOTE — PERIOP NOTE
TRANSFER - OUT REPORT:    Verbal report given to IZZY Schaefer on Sana Ahmadi  being transferred to [facility] for routine post-op       Report consisted of patient's Situation, Background, Assessment and   Recommendations(SBAR).     Information from the following report(s) Nurse Handoff Report, Adult Overview, Surgery Report, Intake/Output, MAR, and Cardiac Rhythm NSR  was reviewed with the receiving nurse.           Lines:   Peripheral IV 08/06/24 Posterior;Right Hand (Active)   Site Assessment Clean, dry & intact 08/06/24 1415   Line Status Infusing 08/06/24 1415   Line Care Connections checked and tightened 08/06/24 1415   Phlebitis Assessment No symptoms 08/06/24 1415   Infiltration Assessment 0 08/06/24 1415   Alcohol Cap Used Yes 08/06/24 1415   Dressing Status Clean, dry & intact 08/06/24 1415   Dressing Type Transparent 08/06/24 1415   Dressing Intervention New 08/06/24 1114        Opportunity for questions and clarification was provided.      Patient transported with:  O2 @ 4lpm and Registered Nurse

## 2024-08-06 NOTE — ANESTHESIA POSTPROCEDURE EVALUATION
Department of Anesthesiology  Postprocedure Note    Patient: Sana Ahmadi  MRN: [id number]  YOB: 1970  Date of evaluation: 8/6/2024    Procedure Summary       Date: 08/06/24 Room / Location: [facility] ASU OR  / [facility] AMBULATORY OR    Anesthesia Start: 1139 Anesthesia Stop: 1414    Procedure: BILATERAL BREAST TOTAL MASTECTOMIES, LEFT BREAST SENTINEL NODE BIOPSY (Bilateral: Breast) Diagnosis:       Invasive lobular carcinoma of left breast, stage 1 (HCC)      (Invasive lobular carcinoma of left breast, stage 1 (HCC) [C50.912])    Surgeons: James Rivera Jr, MD Responsible Provider: Josh Rodríguez MD    Anesthesia Type: General ASA Status: 2            Anesthesia Type: General    Edda Phase I: Edda Score: 8    Edda Phase II:      Anesthesia Post Evaluation    Patient location during evaluation: PACU  Patient participation: complete - patient participated  Level of consciousness: awake and alert  Pain score: 1  Airway patency: patent  Nausea & Vomiting: no vomiting and no nausea  Cardiovascular status: hemodynamically stable  Respiratory status: room air  Hydration status: stable  Multimodal analgesia pain management approach    No notable events documented.

## 2024-08-06 NOTE — ANESTHESIA PRE PROCEDURE
\"BEART\", \"[id number]\"     Type & Screen (If Applicable):  No results found for: \"LABABO\"    Drug/Infectious Status (If Applicable):  No results found for: \"HIV\", \"HEPCAB\"    COVID-19 Screening (If Applicable): No results found for: \"COVID19\"        Anesthesia Evaluation  Patient summary reviewed  Airway: Mallampati: III  TM distance: <3 FB   Neck ROM: full  Mouth opening: < 3 FB   Dental: normal exam         Pulmonary:Negative Pulmonary ROS and normal exam    (+)           current smoker          Patient did not smoke on day of surgery.                 Cardiovascular:  Exercise tolerance: good (>4 METS)  (+) hypertension: moderate      ECG reviewed               Beta Blocker:  Dose within 24 Hrs         Neuro/Psych:   (+) depression/anxiety             GI/Hepatic/Renal: Neg GI/Hepatic/Renal ROS  (+) morbid obesity          Endo/Other: Negative Endo/Other ROS                    Abdominal: normal exam            Vascular: negative vascular ROS.         Other Findings:       Anesthesia Plan      general     ASA 2             Anesthetic plan and risks discussed with patient.    Use of blood products discussed with patient whom.    Plan discussed with CRNA.                Josh Rodríguez MD   8/6/2024

## 2024-08-06 NOTE — DISCHARGE INSTRUCTIONS
Discharge Instructions from Dr. Rivera    I will call you with the pathology results, typically within 1 week from today.  You may shower, but no hot tubs, swimming pools, or baths until your incision is healed.  No heavy lifting with the affected extremity (nothing greater than 5 pounds), and limit its use for the next 4-5 days.  You may use an ice pack for comfort for the next couple of days, but do not place ice directly on the skin.  Rather, use a towel or clothing to serve as a barrier between skin and ice to prevent injury.  If I placed a drain, follow the drain instructions provided, especially as you keep a record of the drain output.  Follow medication instructions carefully.  Watch for signs of infection as listed below.  Redness  Swelling  Drainage from the incision or from your nipple that appears infected  Fever over 101 degrees for consecutive readings, or over 99.5 if you are currently undergoing chemotherapy.  Call our office (number is below) for a follow-up appointment.  If you have any problems, our phone number is [phone].

## 2024-08-06 NOTE — H&P
Measurement Amb   Measurement Type Unilateral   Body Element Arm   Limb Dominance Right   Physical Assessment No Abnormal   Purpose for Testing Baseline   Baseline L-Dex Score - Left 1.1          The patient had a baseline SOZO measurement which I reviewed today. The score is Within normal limits, see scanned to EMR. Bioimpedance spectroscopy helps identify the onset of lymphedema in an arm or leg before patients experience noticeable swelling. Research has shown that 92% of patients with early detection of lymphedema using L-Dex combined with intervention do not progress to chronic lymphedema through three years. Additionally, as of March 2023, the NCCN Guidelines® for Survivorship recommend proactive screening for lymphedema using bioimpedance spectroscopy. Whenever possible, patients are tested for baseline L-Dex score before cancer treatment begins and then are reassessed during regular follow-up visits using the SOZO device. Otherwise, this can be started postoperatively and continued during regular follow-up visits. If the patient's L-Dex score increases above normal levels, that is a sign that lymphedema is developing and a referral is made to physical therapy for further evaluation and early compression treatment. Lymphedema assessment with the SOZO L-Dex score is recommended to be done every 3 months for the first 3 years and then every 6 months for years 4 and 5 followed by annually afterwards.      Review of Systems        Physical Exam  Exam conducted with a chaperone present.   Constitutional:       Appearance: She is not diaphoretic.   HENT:      Head: Normocephalic and atraumatic.      Right Ear: External ear normal.      Left Ear: External ear normal.   Eyes:      General: No scleral icterus.        Right eye: No discharge.         Left eye: No discharge.      Pupils: Pupils are equal, round, and reactive to light.   Cardiovascular:      Rate and Rhythm: Normal rate and regular rhythm.   Pulmonary:

## 2024-08-07 ENCOUNTER — TELEPHONE (OUTPATIENT)
Age: 54
End: 2024-08-07

## 2024-08-07 VITALS
DIASTOLIC BLOOD PRESSURE: 68 MMHG | OXYGEN SATURATION: 91 % | HEART RATE: 77 BPM | BODY MASS INDEX: 40.07 KG/M2 | WEIGHT: 270.5 LBS | HEIGHT: 69 IN | SYSTOLIC BLOOD PRESSURE: 123 MMHG | RESPIRATION RATE: 17 BRPM | TEMPERATURE: 98.4 F

## 2024-08-07 PROCEDURE — 6370000000 HC RX 637 (ALT 250 FOR IP): Performed by: SURGERY

## 2024-08-07 PROCEDURE — 2700000000 HC OXYGEN THERAPY PER DAY

## 2024-08-07 PROCEDURE — 2580000003 HC RX 258: Performed by: SURGERY

## 2024-08-07 RX ADMIN — OXYCODONE HYDROCHLORIDE 5 MG: 5 TABLET ORAL at 11:32

## 2024-08-07 RX ADMIN — OXYCODONE HYDROCHLORIDE 10 MG: 10 TABLET ORAL at 01:06

## 2024-08-07 RX ADMIN — AMLODIPINE BESYLATE 10 MG: 5 TABLET ORAL at 08:05

## 2024-08-07 RX ADMIN — OXYCODONE HYDROCHLORIDE 5 MG: 5 TABLET ORAL at 08:08

## 2024-08-07 RX ADMIN — SODIUM CHLORIDE, PRESERVATIVE FREE 10 ML: 5 INJECTION INTRAVENOUS at 08:05

## 2024-08-07 RX ADMIN — METOPROLOL SUCCINATE 100 MG: 50 TABLET, EXTENDED RELEASE ORAL at 08:05

## 2024-08-07 RX ADMIN — CITALOPRAM HYDROBROMIDE 40 MG: 20 TABLET ORAL at 08:05

## 2024-08-07 ASSESSMENT — PAIN SCALES - GENERAL
PAINLEVEL_OUTOF10: 7
PAINLEVEL_OUTOF10: 5
PAINLEVEL_OUTOF10: 5

## 2024-08-07 ASSESSMENT — PAIN DESCRIPTION - LOCATION
LOCATION: BREAST
LOCATION: BREAST

## 2024-08-07 ASSESSMENT — PAIN DESCRIPTION - ORIENTATION
ORIENTATION: RIGHT;LEFT
ORIENTATION: LEFT;RIGHT

## 2024-08-07 ASSESSMENT — PAIN DESCRIPTION - DESCRIPTORS
DESCRIPTORS: SORE
DESCRIPTORS: SORE

## 2024-08-07 NOTE — PLAN OF CARE
Problem: Pain  Goal: Verbalizes/displays adequate comfort level or baseline comfort level  8/7/2024 0503 by Drew Ybarra, RN  Outcome: Progressing  8/6/2024 1604 by Silvano Sosa, RN  Outcome: Progressing     Problem: Safety - Adult  Goal: Free from fall injury  Outcome: Progressing

## 2024-08-07 NOTE — ACP (ADVANCE CARE PLANNING)
Advance Care Planning     Advance Care Planning Inpatient Note  Spiritual Care Department    Today's Date: 8/7/2024  Unit: [facility] B4 MULTI-SPECIALTY ORTHOPEDICS 2    Received request from HealthCare Provider.  Upon review of chart and communication with care team, patient's decision making abilities are not in question.. Patient, Spouse, and   was/were present in the room during visit.    Goals of ACP Conversation:  Discuss advance care planning documents    Health Care Decision Makers:    Summary:  No Decision Maker named by patient at this time  No healthcare decision makers have been documented.     Advance Care Planning Documents (Patient Wishes):  Patient will go over blank AMD. If patient wants to complete the AMD, she will notify the RN Nurse who will page the .      Assessment:  Chart review. I received and responded to spiritual consult requesting help in completing AMD. Upon arrival, I introduced myself. Met and conversed with patient. Conducted spiritual assessment. Patient's  named Kishor Ochoa was present at bedside. Went over AMD with patient while spouse was present at bedside. Went over all three sections of the AMD with the patient. Left blank AMD with patient. Patient will notify  when ready to complete. Patient did not have additional questions. Please contact spiritual health services if needing further referrals.       Interventions:  Provided education on documents for clarity and greater understanding    Care Preferences Communicated:   No    Outcomes/Plan:  ACP Discussion: Postponed    Electronically signed by Chaplain Florence on 8/7/2024 at 9:57 AM

## 2024-08-07 NOTE — CARE COORDINATION
8/7/2024  10:59 AM      ICD-10-CM    1. Malignant neoplasm of left breast in female, estrogen receptor positive, unspecified site of breast (HCC)  C50.912 NM LYMPHOSCINTIGRAM    Z17.0 NM LYMPHOSCINTIGRAM      2. Invasive lobular carcinoma of left breast in female (HCC)  C50.912 oxyCODONE-acetaminophen (PERCOCET) 5-325 MG per tablet     CANCELED: Verify informed consent     CANCELED: Procedure Consent     CANCELED: Diagnosis for procedure     CANCELED: Full Code     CANCELED: Verify informed consent     CANCELED: Procedure Consent     CANCELED: Diagnosis for procedure     CANCELED: Full Code            General Risk Score: 3   Values used to calculate this score:    Points  Metrics       0        Age: 54       0        Hospital Admissions: 0       2        ED Visits: 2       0        Has Chronic Obstructive Pulmonary Disease: No       0        Has Diabetes: No       0        Has Congestive Heart Failure: No       0        Has Liver Disease: No       0        Has Depression: No       1        Current PCP: Not on file       0        Has Medicaid: No      CM reviewed EMR. Pt is independent at baseline and lives with spouse in a 2 story home.    No CM needs indicated at this time. Providers, if needs arise, please consult case management.       ARIELA Alvarado

## 2024-08-07 NOTE — PLAN OF CARE
Problem: Pain  Goal: Verbalizes/displays adequate comfort level or baseline comfort level  8/7/2024 1002 by Silvano Sosa, RN  Outcome: Progressing  8/7/2024 0503 by Drew Ybarra, RN  Outcome: Progressing     Problem: Discharge Planning  Goal: Discharge to home or other facility with appropriate resources  Outcome: Progressing     Problem: Safety - Adult  Goal: Free from fall injury  8/7/2024 1002 by Silvano Sosa RN  Outcome: Progressing  8/7/2024 0503 by Drew Ybarra, RN  Outcome: Progressing

## 2024-08-07 NOTE — OP NOTE
[facility]          [address] [place]  [address]                            OPERATIVE REPORT      PATIENT NAME: BELEN CHRISTIANSON        : 1970  MED REC NO: [id number]                       ROOM: [facility]  ACCOUNT NO: [id number]                       ADMIT DATE: 2024  PROVIDER: James Rivera Jr, MD    DATE OF SERVICE:  2024    PREOPERATIVE DIAGNOSES:  Carcinoma of the left breast.    POSTOPERATIVE DIAGNOSES:  Carcinoma of the left breast.    PROCEDURES PERFORMED:  Bilateral skin sparing mastectomies with left sentinel node biopsy.    SURGEON:  James Rivera Jr, MD    ASSISTANT:  Shalonda Rivas.    ANESTHESIA:  General.    ESTIMATED BLOOD LOSS:  Minimal.    SPECIMENS REMOVED:  Bilateral breast and left axillary sentinel lymph nodes.    INTRAOPERATIVE FINDINGS:  [place] lymph nodes x3 and bilateral breasts.     COMPLICATIONS:  None.    IMPLANTS:  None.    INDICATIONS:  The patient is a 54-year-old female with above diagnosis, who has opted for bilateral mastectomies with no reconstruction.    DESCRIPTION OF PROCEDURE:  After lymphoscintigraphy in Radiology the patient was brought to the operating room.  After the satisfactory induction of general endotracheal anesthesia, the patient was prepped and draped in sterile fashion.  Attention was turned to the right side 1st, an elliptical incision was made around the nipple-areolar complex, it was deepened through subcutaneous tissues with Bovie cautery.  Skin flaps were raised superior to the clavicle, medial to the sternum, inferior to the inframammary fold, lateral to the latissimus dorsi muscle.  The breast was removed off chest wall subfascially maintaining hemostasis with the Bovie cautery at the lateral border of the pectoralis major muscle.  Breast was mobilized and amputated to the level of the axilla.  All dissection planes were hemostatic.  The wound was anesthetized with a solution

## 2024-08-07 NOTE — PROGRESS NOTES
Spiritual Health Assessment/Progress Note  [facility]    Advance Care Planning,  ,  ,      Name: Sana Ahmadi MRN: [id number]    Age: 54 y.o.     Sex: female   Language: English   Mandaen: None   Invasive lobular carcinoma of left breast, stage 1 (HCC)     Date: 8/7/2024            Total Time Calculated: 28 min              Spiritual Assessment began in [facility] B4 MULTI-SPECIALTY ORTHOPEDICS 2        Referral/Consult From: Nurse   Encounter Overview/Reason: Advance Care Planning  Service Provided For: Patient, Family    Susana, Belief, Meaning:   Patient Other: No affiliation  Family/Friends Other: No affiliation      Importance and Influence:  Patient has spiritual/personal beliefs that influence decisions regarding their health  Family/Friends Other: Unknown    Community:  Patient feels well-supported. Support system includes: Spouse/Partner, Children, and Extended family  Family/Friends feel well-supported. Support system includes: Spouse/Partner, Children, and Friends    Assessment and Plan of Care:     Patient Interventions include: Facilitated expression of thoughts and feelings and Assisted in Advance Care Planning conversation  Family/Friends Interventions include: Other: Checked on spouse morale.    Patient Plan of Care: Spiritual Care available upon further referral  Family/Friends Plan of Care: Spiritual Care available upon further referral    Chart review. I received and responded to spiritual consult requesting help in completing AMD. Upon arrival, I introduced myself. Met and conversed with patient. Conducted spiritual assessment. Patient's  named Kishor Ochoa was present at bedside. Went over AMD with patient while spouse was present at bedside. Went over all three sections of the AMD with the patient. Left blank AMD with patient. Patient will notify  when ready to complete. Patient has been  for thirteen years, and have no children. Patient and spouse have

## 2024-08-12 ENCOUNTER — TELEPHONE (OUTPATIENT)
Age: 54
End: 2024-08-12

## 2024-08-12 NOTE — TELEPHONE ENCOUNTER
Called and spoke to patient.  Reviewed pathology.    Has two drains - each putting out more than 30ml/day.  Will call when she is ready to have the removed.    No post-op scheduled with Dr. Rivera.  She will call the office to schedule.

## 2024-08-13 ENCOUNTER — TELEPHONE (OUTPATIENT)
Age: 54
End: 2024-08-13

## 2024-08-14 DIAGNOSIS — C50.912 INVASIVE LOBULAR CARCINOMA OF LEFT BREAST IN FEMALE (HCC): Primary | ICD-10-CM

## 2024-08-14 RX ORDER — OXYCODONE HYDROCHLORIDE AND ACETAMINOPHEN 5; 325 MG/1; MG/1
1 TABLET ORAL EVERY 6 HOURS PRN
Qty: 12 TABLET | Refills: 0 | Status: SHIPPED | OUTPATIENT
Start: 2024-08-14 | End: 2024-08-17

## 2024-08-14 NOTE — TELEPHONE ENCOUNTER
Patient called back.  She complains of post op pain.  She says that she has been trying tylenol and ice for pain control which helped, but she is still in a considerable amount of pain.  She is requesting a refill of her pain medicine.

## 2024-08-19 ENCOUNTER — NURSE ONLY (OUTPATIENT)
Age: 54
End: 2024-08-19

## 2024-08-19 DIAGNOSIS — C50.912 INVASIVE LOBULAR CARCINOMA OF LEFT BREAST IN FEMALE (HCC): Primary | ICD-10-CM

## 2024-08-19 PROCEDURE — 99024 POSTOP FOLLOW-UP VISIT: CPT | Performed by: SURGERY

## 2024-08-19 ASSESSMENT — PATIENT HEALTH QUESTIONNAIRE - PHQ9
SUM OF ALL RESPONSES TO PHQ QUESTIONS 1-9: 0
1. LITTLE INTEREST OR PLEASURE IN DOING THINGS: NOT AT ALL
SUM OF ALL RESPONSES TO PHQ QUESTIONS 1-9: 0
2. FEELING DOWN, DEPRESSED OR HOPELESS: NOT AT ALL
SUM OF ALL RESPONSES TO PHQ QUESTIONS 1-9: 0
SUM OF ALL RESPONSES TO PHQ9 QUESTIONS 1 & 2: 0
SUM OF ALL RESPONSES TO PHQ QUESTIONS 1-9: 0

## 2024-08-19 NOTE — PROGRESS NOTES
HISTORY OF PRESENT ILLNESS  Sana Ahmadi is a 54 y.o. female     HPI ESTABLISHED patient here for nurse visit to remove MICHELLE drains.  Patient reports each drain has only drained 25cc for the last two days.     Review of Systems      Physical Exam       ASSESSMENT and PLAN  MICHELLE drains removed without issue.  Patient to follow up with Dr. Rivera next week or sooner PRN.

## 2024-08-27 ENCOUNTER — CLINICAL DOCUMENTATION (OUTPATIENT)
Age: 54
End: 2024-08-27

## 2024-08-27 NOTE — PROGRESS NOTES
Mammaprint resulted this afternoon. Score is - 0.263 high risk. Copy given to PSR to scan into the chart.

## 2024-08-28 ENCOUNTER — CLINICAL DOCUMENTATION (OUTPATIENT)
Facility: HOSPITAL | Age: 54
End: 2024-08-28

## 2024-08-28 ENCOUNTER — TELEPHONE (OUTPATIENT)
Age: 54
End: 2024-08-28

## 2024-08-28 ENCOUNTER — OFFICE VISIT (OUTPATIENT)
Age: 54
End: 2024-08-28

## 2024-08-28 VITALS — HEIGHT: 69 IN | BODY MASS INDEX: 39.99 KG/M2 | WEIGHT: 270 LBS

## 2024-08-28 DIAGNOSIS — Z90.13 S/P MASTECTOMY, BILATERAL: Primary | ICD-10-CM

## 2024-08-28 PROCEDURE — 99024 POSTOP FOLLOW-UP VISIT: CPT | Performed by: SURGERY

## 2024-08-28 NOTE — PROGRESS NOTES
HISTORY OF PRESENT ILLNESS  Sana Ahmadi is a 54 y.o. female.    HPI  ESTABLISHED Patient here for post op follow up. Has been having a numbness in both axillary areas but the LEFT is worse.      6/12/24: LEFT breast, 2:00, 2 cmfn, bx PATH: ILC, grade 1, measuring 12 mm. ER+(97%)/[place]-/HER2-, Ki-67 25%    8/6/24: BILATERAL mastectomies and LEFT SLNBx PATH:   - RIGHT breast, simple mastectomy: Focal atypical ductal hyperplasia. No evidence of carcinoma.   - LEFT breast, simple mastectomy: ILC, overall grade 2, 40 mm and 2mm. LCIS. Negative margins. Pathologic stage:  pT2 (m), pN0 (sn). ER+(99)/[place]+(75)/HER2-, Ki-67 2%.   - LEFT axillary SLN: Three benign LNs (0/3)    8/27/24: Mammaprint- High risk 0.263     8/19/24- MICHELLE drains pulled, no problem    Past Medical History:   Diagnosis Date    Breast cancer (HCC)     left side    High blood pressure     High cholesterol     Psoriasis        Past Surgical History:   Procedure Laterality Date    COLONOSCOPY      DENTAL SURGERY      MASTECTOMY Bilateral 8/6/2024    BILATERAL BREAST TOTAL MASTECTOMIES, LEFT BREAST SENTINEL NODE BIOPSY performed by Nicole Bonilla, James ARMANDO MD at [facility] AMBULATORY OR    TUMOR EXCISION      as a young child just below eye on left    UPPER GASTROINTESTINAL ENDOSCOPY         Social History     Socioeconomic History    Marital status:      Spouse name: Not on file    Number of children: Not on file    Years of education: Not on file    Highest education level: Not on file   Occupational History    Not on file   Tobacco Use    Smoking status: Every Day     Types: Cigarettes    Smokeless tobacco: Never   Vaping Use    Vaping status: Never Used   Substance and Sexual Activity    Alcohol use: Yes     Alcohol/week: 15.0 standard drinks of alcohol     Types: 15 Shots of liquor per week    Drug use: Not Currently    Sexual activity: Not on file   Other Topics Concern    Not on file   Social History Narrative    Not on file     Social Determinants  [facility] [facility]     Financial Resource Strain: Not on file   Food Insecurity: Not on file   Transportation Needs: Not on file   Physical Activity: Not on file   Stress: Not on file   Social Connections: Not on file   Intimate Partner Violence: Not on file   Housing Stability: Not on file       Current Outpatient Medications on File Prior to Visit   Medication Sig Dispense Refill    acetaminophen (TYLENOL) 325 MG tablet Take 2 tablets by mouth every 6 hours as needed for Pain      Caffeine (VIVARIN) 200 MG TABS Take 1 tablet by mouth every 4 hours as needed for Other      citalopram (CELEXA) 40 MG tablet Take 1 tablet by mouth daily      amLODIPine (NORVASC) 10 MG tablet Take 1 tablet by mouth daily      Metoprolol Succinate 100 MG CS24 Take 1 capsule every day by oral route.      mirtazapine (REMERON) 15 MG tablet Take 1 tablet by mouth nightly at bedtime.       No current facility-administered medications on file prior to visit.       Allergies   Allergen Reactions    Penicillins Rash    Statins Nausea And Vomiting     Cough        OB History    No obstetric history on file.      Obstetric Comments   Menarche 13, LMP 2019, # of children 0, age of 1st delivery -, Hysterectomy/oophorectomy no/no, Breast bx yes, history of breast feeding no, BCP yes, Hormone therapy no               Review of Systems      Physical Exam  Exam conducted with a chaperone present.   Constitutional:       Appearance: She is not diaphoretic.   HENT:      Head: Normocephalic and atraumatic.      Right Ear: External ear normal.      Left Ear: External ear normal.   Eyes:      General: No scleral icterus.        Right eye: No discharge.         Left eye: No discharge.      Pupils: Pupils are equal, round, and reactive to light.   Cardiovascular:      Rate and Rhythm: Normal rate and regular rhythm.   Pulmonary:      Effort: Pulmonary effort is normal. No respiratory distress.      Breath sounds: Normal breath sounds. No stridor.   Chest:

## 2024-08-28 NOTE — PROGRESS NOTES
HISTORY OF PRESENT ILLNESS  Sana Ahmadi is a 54 y.o. female     HPI ESTABLISHED Patient here for post op follow up. Has been having a numbness in both axillary areas but the LEFT is worse.       6/12/24: LEFT breast, 2:00, 2 cmfn, bx PATH: ILC, grade 1, measuring 12 mm. ER+(97%)/[place]-/HER2-, Ki-67 25%     8/6/24: BILATERAL mastectomies and LEFT SLNBx PATH:   - RIGHT breast, simple mastectomy: Focal atypical ductal hyperplasia. No evidence of carcinoma.   - LEFT breast, simple mastectomy: ILC, overall grade 2, 40 mm and 2mm. LCIS. Negative margins. Pathologic stafeL pT2 (m), pN0 (sn). ER+(99)/[place]+(75)/HER2-, Ki-67 2%.   - LEFT axillary SLN: Three benign LNs (0/3)     Mammaprint- High risk 0.263    8/19/24- MICHELLE drains pulled, no problem    Review of Systems      Physical Exam       ASSESSMENT and PLAN  {Assessment and Plan Chronic Disease:[id number]}

## 2024-08-29 NOTE — PROGRESS NOTES
[facility]  Breast Navigator Encounter    Name:    Sana Ahmadi  Age:    54 y.o.  Diagnosis:   LEFT breast cancer    Saw patient briefly at the time of her post-op appt with Dr. Rivera.  She is a little over three weeks post-op BILATERAL mastectomies without reconstruction.    Had a rough week last week with a lot of sharp \"nerve like\" pain.  This is better this week.      I will continue to follow her.                              Natalie Girard RN, BSN, [facility]  Oncology Breast Navigator     [address]  [address]  W: [phone]  F: [phone]  [email or web address]  Good Help to Those in Need®

## 2024-08-30 ENCOUNTER — CLINICAL DOCUMENTATION (OUTPATIENT)
Age: 54
End: 2024-08-30

## 2024-08-30 NOTE — PROGRESS NOTES
Received ppw from [employer] for additional information for this pt. All documents have been completed and report requested have been attached to ppw and faxed to number provided.

## 2024-09-06 ENCOUNTER — TELEPHONE (OUTPATIENT)
Age: 54
End: 2024-09-06

## 2024-09-06 ENCOUNTER — CLINICAL DOCUMENTATION (OUTPATIENT)
Age: 54
End: 2024-09-06

## 2024-09-06 NOTE — PROGRESS NOTES
"  Occupational Therapy      Occupational Therapy Treatment    Name: Stefanie Cisneros Dhyll  MRN: [id number]  : 1957  Date: 24  Time Calculation  Start Time: 1103  Stop Time: 1148  Time Calculation (min): 45 min  OT Therapeutic Procedures Time Entry  Therapeutic Exercise Time Entry: 45,      Assessment: Pt demonstrated ability to raise arm actively to 150 degrees. Weakness noted with rotator cuff strengthening      Plan: Continue to advance  PRE's as tolerated        Subjective \" I can tell it's weaker than my other arm\"          Pain Assessment: 1-2/10 R shoulder        Objective    AROM R shoulder:  Flexion: 150  Abduction: 140  ER: 70  IR: L5  Strength: R   IR 4/5  ER 3/5  Flex 3/5  Abd 3/5   Modalities:       Communication:     Splinting:     Therapeutic Exercise  Supine active shoulder flexion x 10x3  Row and sh extension x 15x3 with 7 lbs   Ball walks x 10x2  ER/IR with yellow band x 15x3   Supine horizontal abd and D2 PNF x 15x3 each   Scaption x 10x3 with 2 lbs   Forward press BUE's with 3 lb ball x 10x3      Manual Therapy  Scar mobs   STM to biceps and delt          Therapy/Activity:   Strength:     Other Activity:     Outcome Measures:      OP EDUCATION:      Goals:  Active       OT Problem       PATIENT WILL DEMONSTRATE INDEPENDENCE WITH UPPER BODY donning and doffing all UE clothes       Start:  24    Expected End:  04/15/24            PATIENT WILL DEMONSTRATE INDEPENDENCE WITH LOWER BODY donning and doffing all LE clothes        Start:  24    Expected End:  04/15/24            PATIENT WILL  style HAIR WITH independent       Start:  24    Expected End:  24            PATIENT WILL ACHIEVE right SHOULDER FLEXION STRENGTH OF 4/5       Start:  24    Expected End:  24            PATIENT WILL ACHIEVE right SHOULDER ABDUCTION STRENGTH OF 4/5       Start:  24    Expected End:  24            PATIENT WILL ACHIEVE right SHOULDER EXTERNAL ROTATION STRENGTH " Faxed office Post Op note and surgical pathology report to [facility] as requested. Confirmation received.   OF 4/5 IN neutral       Start:  03/11/24    Expected End:  06/03/24            PATIENT WILL ACHIEVE right SHOULDER FLEXION  DEGREES       Start:  03/11/24    Expected End:  05/20/24            PATIENT WILL ACHIEVE right SHOULDER ABDUCTION  DEGREES       Start:  03/11/24    Expected End:  05/20/24            PATIENT WILL ACHIEVE right SHOULDER EXTERNAL ROTATION OF 80 DEGREES IN 90 degrees abd       Start:  03/11/24    Expected End:  05/20/24            PATIENT WILL ACHIEVE right SHOULDER INTERNAL ROTATION TO L5 to improve upper body dressing and bathing        Start:  03/11/24    Expected End:  05/20/24            PATIENT WILL SHOW A SIGNIFICANT CHANGE IN UEFI PATIENT REPORTED OUTCOME TOOL TO DEMONSTRATE SUBJECTIVE IMPROVEMENT       Start:  03/11/24    Expected End:  06/03/24            PATIENT WILL REPORT PAIN OF 0-1/10 DEMONSTRATING A REDUCTION OF OVERALL PAIN       Start:  03/11/24    Expected End:  05/20/24            PATIENT WILL DEMONSTRATE INDEPENDENCE IN HOME PROGRAM FOR SUPPORT OF PROGRESSION       Start:  03/11/24    Expected End:  06/03/24            PATIENT WILL DEMONSTRATE ABILITY TO FOLLOW RCR PRECUATIONS INDEPENDENTLY       Start:  03/11/24    Expected End:  06/03/24

## 2024-09-19 ENCOUNTER — TELEPHONE (OUTPATIENT)
Facility: HOSPITAL | Age: 54
End: 2024-09-19

## 2024-09-24 ENCOUNTER — TELEPHONE (OUTPATIENT)
Age: 54
End: 2024-09-24

## 2024-09-26 ENCOUNTER — TELEPHONE (OUTPATIENT)
Facility: HOSPITAL | Age: 54
End: 2024-09-26

## 2024-10-12 ENCOUNTER — HOSPITAL ENCOUNTER (OUTPATIENT)
Facility: HOSPITAL | Age: 54
Discharge: HOME | End: 2024-10-15
Attending: INTERNAL MEDICINE
Payer: COMMERCIAL

## 2024-10-12 DIAGNOSIS — C50.412 MALIGNANT NEOPLASM OF UPPER-OUTER QUADRANT OF LEFT FEMALE BREAST, UNSPECIFIED ESTROGEN RECEPTOR STATUS (HCC): ICD-10-CM

## 2024-10-12 PROCEDURE — 77080 DXA BONE DENSITY AXIAL: CPT

## 2025-01-15 ENCOUNTER — TELEPHONE (OUTPATIENT)
Age: 55
End: 2025-01-15

## 2025-01-15 NOTE — TELEPHONE ENCOUNTER
Returned pt call, immediately sent to  so left  that we unfortunately do not have a record of the seminar being completed and that it has been sent to the email she has provided in her consultation form, and that unfortunately she will have to re watch the seminar.     I also informed the pt in the  that watching on the phone may have been where a disconnect came up and to avoid a disconnect to not watch it on the phone.

## 2025-01-15 NOTE — TELEPHONE ENCOUNTER
Called and left VM for pt regarding interest in the bariatrics program     Instructed pt to call back to receive information regarding program, and to ask for Dewey

## 2025-01-15 NOTE — TELEPHONE ENCOUNTER
Patient stated she has completed SW[facility] seminar on 1/15/25; unable to verify using SWL tracker; please contact patient at [phone]

## 2025-01-30 ENCOUNTER — TELEPHONE (OUTPATIENT)
Age: 55
End: 2025-01-30

## 2025-03-04 ENCOUNTER — TELEPHONE (OUTPATIENT)
Age: 55
End: 2025-03-04

## 2025-03-04 NOTE — TELEPHONE ENCOUNTER
Request for mammaprint results received from Ailin at [facility].  Faxed report to [phone]

## 2025-06-23 ENCOUNTER — TELEPHONE (OUTPATIENT)
Age: 55
End: 2025-06-23

## 2025-06-23 NOTE — TELEPHONE ENCOUNTER
Attempted to call patient regarding SWL NP paperwork, paperwork needs to be sent in and appt made with provider if pt is still interested in bariatric surgery.     Ok to make appt when pt calls but make patient aware paperwork will need to be completed before her appt.     Thanks!

## 2025-06-30 ENCOUNTER — TELEPHONE (OUTPATIENT)
Age: 55
End: 2025-06-30

## 2025-06-30 NOTE — TELEPHONE ENCOUNTER
Attempted to call patient regarding SWL NP paperwork, paperwork needs to be sent in and appt made with provider if pt is still interested in bariatric surgery.     LVM to return call.      Per Marianela paperwork needs to be completed before scheduling appt.      Thanks!

## (undated) DEVICE — CHEST PACK-SFMCASU: Brand: MEDLINE INDUSTRIES, INC.

## (undated) DEVICE — BLADE ES ELASTOMERIC COAT INSUL DURABLE BEND UPTO 90DEG

## (undated) DEVICE — HYPODERMIC SAFETY NEEDLE: Brand: MAGELLAN

## (undated) DEVICE — SUTURE VICRYL + SZ 3-0 L27IN ABSRB UD L26MM SH 1/2 CIR VCP416H

## (undated) DEVICE — GLOVE ORANGE PI 7 1/2   MSG9075

## (undated) DEVICE — 3M™ TEGADERM™ TRANSPARENT FILM DRESSING FRAME STYLE, 1626W, 4 IN X 4-3/4 IN (10 CM X 12 CM), 50/CT 4CT/CASE: Brand: 3M™ TEGADERM™

## (undated) DEVICE — SUTURE MONOCRYL + SZ 4-0 L27IN ABSRB UD L19MM PS-2 3/8 CIR MCP426H

## (undated) DEVICE — DRAIN SURG 19FR 100% SIL RADPQ RND CHN FULL FLUT

## (undated) DEVICE — SOLUTION IRRIG 1000ML STRL H2O USP PLAS POUR BTL

## (undated) DEVICE — DRAIN SURG 19FR 0.25IN SIL RND W/ TRCR INDIC DOT RADPQ FULL

## (undated) DEVICE — BOWL MED L 32OZ PLAS W/ MOLD GRAD EZ OPN PEEL PCH

## (undated) DEVICE — RESERVOIR,SUCTION,100CC,SILICONE: Brand: MEDLINE

## (undated) DEVICE — PENCIL SMK EVAC L10FT DIA95MM TBNG NONSTICK W ADPT TO 22MM

## (undated) DEVICE — CANISTER, RIGID, 3000CC: Brand: MEDLINE INDUSTRIES, INC.

## (undated) DEVICE — AEGIS 1" DISK 4MM HOLE, PEEL OPEN: Brand: MEDLINE

## (undated) DEVICE — SOLUTION IRRIG 1000ML 0.9% SOD CHL USP POUR PLAS BTL

## (undated) DEVICE — SUTURE PERMA-HAND SZ 2-0 L30IN NONABSORBABLE BLK L26MM SH K833H

## (undated) DEVICE — TUBING, SUCTION, 1/4" X 10', STRAIGHT: Brand: MEDLINE

## (undated) DEVICE — BANDAGE COBAN 4 IN COMPR W4INXL5YD FOAM COHESIVE QUIK STK SELF ADH SFT